# Patient Record
Sex: MALE | Race: WHITE | NOT HISPANIC OR LATINO | Employment: FULL TIME | URBAN - METROPOLITAN AREA
[De-identification: names, ages, dates, MRNs, and addresses within clinical notes are randomized per-mention and may not be internally consistent; named-entity substitution may affect disease eponyms.]

---

## 2023-08-21 ENCOUNTER — OFFICE VISIT (OUTPATIENT)
Dept: URGENT CARE | Facility: URGENT CARE | Age: 38
End: 2023-08-21
Payer: COMMERCIAL

## 2023-08-21 VITALS
OXYGEN SATURATION: 97 % | SYSTOLIC BLOOD PRESSURE: 126 MMHG | TEMPERATURE: 97.2 F | DIASTOLIC BLOOD PRESSURE: 89 MMHG | HEART RATE: 57 BPM
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
## 2023-08-21 PROCEDURE — 99203 OFFICE O/P NEW LOW 30 MIN: CPT

## 2023-08-21 RX ORDER — CEPHALEXIN 500 MG/1
500 CAPSULE ORAL 4 TIMES DAILY
Qty: 28 CAPSULE | Refills: 0 | Status: SHIPPED | OUTPATIENT
Start: 2023-08-21 | End: 2023-08-28

## 2023-08-22 NOTE — PROGRESS NOTES
Assessment/Plan:    Secondary bacterial infection/cellulitis vs inflammatory/localized allergic response to insect bite.   Due to tenderness, will cover for possible early bacterial infection with Keflex. May also use antihistamines.     See patient instructions below.    At the end of the encounter, I discussed results, diagnosis, medications. Discussed red flags for immediate return to clinic/ER, as well as indications for follow up if no improvement. Patient understood and agreed to plan. Patient was stable for discharge.      ICD-10-CM    1. Bug bite with infection, initial encounter  W57.XXXA cephALEXin (KEFLEX) 500 MG capsule            Return in about 3 days (around 8/24/2023) for Follow up w/ primary care provider if not better.    JAJA Bolaños, GABRIELA  [facility]  -----------------------------------------------------------------------------------------------------------------------------------------------------    HPI:  David Alexander is a 38 year old male who presents for evaluation of redness, warmth, and tenderness surrounding insect bite to L upper arm onset 3 days ago. The area of initial insect bite is also mildly itchy. Symptoms have gradually worsened. No treatments tried. Patient reports no fever/chills, drainage, or any other symptoms.     History reviewed. No pertinent past medical history.    Vitals:    08/21/23 1825   BP: 126/89   BP Location: Right arm   Patient Position: Sitting   Cuff Size: Adult Regular   Pulse: 57   Temp: 97.2  F (36.2  C)   TempSrc: Tympanic   SpO2: 97%       Physical Exam  Vitals and nursing note reviewed.   Pulmonary:      Effort: Pulmonary effort is normal.   Skin:         Neurological:      Mental Status: He is alert.         Labs/Imaging:  No results found for this or any previous visit (from the past 24 hour(s)).  No results found for this or any previous visit (from the past 24 hour(s)).        Patient Instructions   -Complete  antibiotics as prescribed. Take with food to help prevent stomach upset.   -Elevate the affected limb as much as possible. This will help keep the swelling down.  -Keep the infected area clean; warm water soak with mild soap for 10-15 minutes 3 times a day. Pat dry.  -Take Tylenol 650mg every 4 hours or ibuprofen 600mg every 6 hours by mouth for pain/fever.  Do not exceed 4000mg of acetaminophen or 2400mg of ibuprofen from any source in a 24 hour period.  Taking Tylenol and ibuprofen together may be helpful in reducing pain.   -If develop a fever, increased redness, pain, drainage or swelling from the area, should contact primary care clinic/provider.  -Symptoms should be improving within 48 hours and resolved in next 7-10 days.

## 2023-08-22 NOTE — PATIENT INSTRUCTIONS
-Complete antibiotics as prescribed. Take with food to help prevent stomach upset.   -Elevate the affected limb as much as possible. This will help keep the swelling down.  -Keep the infected area clean; warm water soak with mild soap for 10-15 minutes 3 times a day. Pat dry.  -Take Tylenol 650mg every 4 hours or ibuprofen 600mg every 6 hours by mouth for pain/fever.  Do not exceed 4000mg of acetaminophen or 2400mg of ibuprofen from any source in a 24 hour period.  Taking Tylenol and ibuprofen together may be helpful in reducing pain.   -If develop a fever, increased redness, pain, drainage or swelling from the area, should contact primary care clinic/provider.  -Symptoms should be improving within 48 hours and resolved in next 7-10 days.